# Patient Record
Sex: FEMALE | NOT HISPANIC OR LATINO | Employment: OTHER | URBAN - METROPOLITAN AREA
[De-identification: names, ages, dates, MRNs, and addresses within clinical notes are randomized per-mention and may not be internally consistent; named-entity substitution may affect disease eponyms.]

---

## 2020-01-08 ENCOUNTER — TELEPHONE (OUTPATIENT)
Dept: INTERNAL MEDICINE | Facility: CLINIC | Age: 75
End: 2020-01-08

## 2020-03-12 ENCOUNTER — TRANSCRIBE ORDERS (OUTPATIENT)
Dept: ADMINISTRATIVE | Facility: HOSPITAL | Age: 75
End: 2020-03-12

## 2020-03-12 DIAGNOSIS — I10 BENIGN HYPERTENSION: ICD-10-CM

## 2020-03-12 DIAGNOSIS — N18.30 CHRONIC KIDNEY DISEASE, STAGE III (MODERATE) (HCC): Primary | ICD-10-CM

## 2020-04-09 ENCOUNTER — APPOINTMENT (OUTPATIENT)
Dept: ULTRASOUND IMAGING | Facility: HOSPITAL | Age: 75
End: 2020-04-09

## 2020-04-09 ENCOUNTER — APPOINTMENT (OUTPATIENT)
Dept: CARDIOLOGY | Facility: HOSPITAL | Age: 75
End: 2020-04-09

## 2020-05-11 ENCOUNTER — HOSPITAL ENCOUNTER (OUTPATIENT)
Dept: ULTRASOUND IMAGING | Facility: HOSPITAL | Age: 75
End: 2020-05-11

## 2020-05-11 ENCOUNTER — APPOINTMENT (OUTPATIENT)
Dept: CARDIOLOGY | Facility: HOSPITAL | Age: 75
End: 2020-05-11

## 2020-06-01 ENCOUNTER — APPOINTMENT (OUTPATIENT)
Dept: ULTRASOUND IMAGING | Facility: HOSPITAL | Age: 75
End: 2020-06-01

## 2020-06-01 ENCOUNTER — APPOINTMENT (OUTPATIENT)
Dept: CARDIOLOGY | Facility: HOSPITAL | Age: 75
End: 2020-06-01

## 2020-06-05 ENCOUNTER — HOSPITAL ENCOUNTER (OUTPATIENT)
Dept: CARDIOLOGY | Facility: HOSPITAL | Age: 75
Discharge: HOME | End: 2020-06-05

## 2020-06-05 ENCOUNTER — HOSPITAL ENCOUNTER (OUTPATIENT)
Dept: ULTRASOUND IMAGING | Facility: HOSPITAL | Age: 75
Discharge: HOME | End: 2020-06-05
Admitting: INTERNAL MEDICINE

## 2020-06-05 DIAGNOSIS — I10 BENIGN HYPERTENSION: ICD-10-CM

## 2020-06-05 DIAGNOSIS — N18.30 CHRONIC KIDNEY DISEASE, STAGE III (MODERATE) (HCC): ICD-10-CM

## 2020-06-05 LAB
BH CV ECHO MEAS - DIST REN A EDV LEFT: 17.4 CM/S
BH CV ECHO MEAS - DIST REN A PSV LEFT: 65.2 CM/S
BH CV ECHO MEAS - MID REN A EDV LEFT: 16.8 CM/S
BH CV ECHO MEAS - MID REN A PSV LEFT: 60.8 CM/S
BH CV ECHO MEAS - PROX REN A EDV LEFT: 17.4 CM/S
BH CV ECHO MEAS - PROX REN A PSV LEFT: 67.4 CM/S
BH CV VAS BP LEFT ARM: NORMAL MMHG
BH CV VAS RENAL AORTIC MID EDV: 12.2 CM/S
BH CV VAS RENAL AORTIC MID PSV: 61.7 CM/S
BH CV VAS RENAL HILUM LEFT EDV: 12.2 CM/S
BH CV VAS RENAL HILUM LEFT PSV: 49.9 CM/S
BH CV VAS RENAL HILUM RIGHT EDV: 29.9 CM/S
BH CV VAS RENAL HILUM RIGHT PSV: 109 CM/S
BH CV XLRA MEAS DIST REN A EDV RIGHT: 29.2 CM/S
BH CV XLRA MEAS DIST REN A PSV RIGHT: 95.7 CM/S
BH CV XLRA MEAS MID REN A EDV RIGHT: 23.4 CM/S
BH CV XLRA MEAS MID REN A PSV RIGHT: 76 CM/S
BH CV XLRA MEAS PROX REN A EDV RIGHT: 15.6 CM/S
BH CV XLRA MEAS PROX REN A PSV RIGHT: 59.2 CM/S
BH CV XLRA MEAS RAR LEFT: 1.1
BH CV XLRA MEAS RAR RIGHT: 1.6
BH CV XLRA MEAS RENAL A ORG EDV LEFT: 9.22 CM/S
BH CV XLRA MEAS RENAL A ORG EDV RIGHT: 14.2 CM/S
BH CV XLRA MEAS RENAL A ORG PSV LEFT: 38.6 CM/S
BH CV XLRA MEAS RENAL A ORG PSV RIGHT: 66 CM/S
LEFT RENAL UPPER PARENCHYMA MAX: 41.5 CM/S
LEFT RENAL UPPER PARENCHYMA MIN: 8.29 CM/S
LEFT RENAL UPPER PARENCHYMA RI: 0.8
RIGHT RENAL UPPER PARENCHYMA MAX: 31.5 CM/S
RIGHT RENAL UPPER PARENCHYMA MIN: 6.7 CM/S
RIGHT RENAL UPPER PARENCHYMA RI: 0.79

## 2020-06-05 PROCEDURE — 93975 VASCULAR STUDY: CPT

## 2020-06-05 PROCEDURE — 76775 US EXAM ABDO BACK WALL LIM: CPT

## 2020-11-13 ENCOUNTER — CONSULT (OUTPATIENT)
Dept: ONCOLOGY | Facility: CLINIC | Age: 75
End: 2020-11-13

## 2020-11-13 VITALS
RESPIRATION RATE: 16 BRPM | DIASTOLIC BLOOD PRESSURE: 100 MMHG | HEART RATE: 69 BPM | BODY MASS INDEX: 26.98 KG/M2 | OXYGEN SATURATION: 96 % | TEMPERATURE: 97.3 F | WEIGHT: 158 LBS | HEIGHT: 64 IN | SYSTOLIC BLOOD PRESSURE: 193 MMHG

## 2020-11-13 DIAGNOSIS — D47.2 MONOCLONAL GAMMOPATHY: Primary | ICD-10-CM

## 2020-11-13 PROCEDURE — 99204 OFFICE O/P NEW MOD 45 MIN: CPT | Performed by: INTERNAL MEDICINE

## 2020-11-13 RX ORDER — ROSUVASTATIN CALCIUM 20 MG/1
TABLET, COATED ORAL
COMMUNITY
Start: 2020-11-12

## 2020-11-13 RX ORDER — PREDNISONE 20 MG/1
TABLET ORAL
COMMUNITY
Start: 2020-11-05

## 2020-11-13 RX ORDER — UBROGEPANT 50 MG/1
TABLET ORAL
COMMUNITY
Start: 2020-10-19

## 2020-11-13 RX ORDER — CALCITRIOL 0.25 UG/1
CAPSULE, LIQUID FILLED ORAL
COMMUNITY
Start: 2020-10-09

## 2020-11-13 RX ORDER — SOTALOL HYDROCHLORIDE 80 MG/1
TABLET ORAL
COMMUNITY
Start: 2020-09-30

## 2020-11-13 RX ORDER — LOSARTAN POTASSIUM 100 MG/1
TABLET ORAL
COMMUNITY
Start: 2020-10-05

## 2020-11-13 NOTE — PROGRESS NOTES
CHIEF COMPLAINT: Monoclonal gammopathy    REASON FOR REFERRAL: Same      RECORDS OBTAINED  Records of the patients history including those obtained from Dr. Chinchilla were reviewed and summarized in detail.    Oncology/Hematology History Overview Note   1.  Monoclonal gammopathy  2.  Stage III kidney disease presumably nephrosclerosis  3.  Hypertension   4.  Secondary hyperparathyroidism  5.  Migraine  6.  Borderline positive RADHA 1: 40 speckled pattern with dry eyes.  7.  Renal Cysts    -11/13/2020 initial [facility] hematology oncology consult: Dr. Chinchilla referred patientWith slightly elevated albumin to creatinine ratio and IgG kappa in the blood and urine.  I do not have the exact data from Dr. Chinchilla but just his office note.  Also with a borderline positive RADHA 1: 40 in a speckled pattern with dry eyes so could have associated autoimmune disease causing monoclonal gammopathy of undetermined significance as well.  I will get the myeloma panel and if that persist we will need to get a bone marrow biopsy and ultimately probably PET scan etc.  We will do phone visit with patient to go over the results of this first panel of tests to decide our next steps.  It will be important, if renal disease is our only CRAB criterion, to determine whether the renal disease is actually due to the monoclonal gammopathy or as indicated in Dr. Chinchilla's note that this is in fact unrelated nephrosclerosis.  If she has persistent monoclonal gammopathy and significant plasma cell increase over 10%, it may require kidney biopsy to sort out but I will discuss with Dr. Chinchilla at that junction.  If there is no significant plasma cell increase and modest light chains, then this could be just a marker of some autoimmune inflammatory process such as witnessed by the borderline positive RADHA.       Monoclonal gammopathy       HISTORY OF PRESENT ILLNESS:  The patient is a 75 y.o.  female, referred for monoclonal gammopathy on work-up of  "renal disease by Dr. Chinchilla with borderline positive RADHA and dry eyes    REVIEW OF SYSTEMS:  A 14 point review of systems was performed and is negative except as noted above.    History reviewed. No pertinent past medical history.  History reviewed. No pertinent surgical history.    Current Outpatient Medications on File Prior to Visit   Medication Sig Dispense Refill   • calcitriol (ROCALTROL) 0.25 MCG capsule      • losartan (COZAAR) 100 MG tablet      • predniSONE (DELTASONE) 20 MG tablet      • rosuvastatin (CRESTOR) 20 MG tablet      • sotalol (BETAPACE) 80 MG tablet      • Trelegy Ellipta 100-62.5-25 MCG/INH aerosol powder       • ubrogepant tablet        No current facility-administered medications on file prior to visit.        No Known Allergies    Social History     Socioeconomic History   • Marital status: Unknown     Spouse name: Not on file   • Number of children: Not on file   • Years of education: Not on file   • Highest education level: Not on file       History reviewed. No pertinent family history.    PHYSICAL EXAM:    BP (!) 193/100   Pulse 69   Temp 97.3 °F (36.3 °C)   Resp 16   Ht 162.6 cm (64\")   Wt 71.7 kg (158 lb)   SpO2 96%   BMI 27.12 kg/m²     ECOG: (1) Restricted in Physically Strenuous Activity, Ambulatory & Able to Do Work of Light Nature  General: well appearing female in no acute distress  HEENT: sclera anicteric, oropharynx clear.  Sclera slightly irritated however.  Lymphatics: no cervical, supraclavicular, inguinal, or axillary adenopathy  Cardiovascular: regular rate and rhythm, no murmurs  Neck: Supple; No thyromegaly  Lungs: clear to auscultation bilaterally. No respiratory distress.   Abdomen: soft, nontender, nondistended.  No palpable organomegaly  Extremities: no cyanosis, clubbing, edema, or cords  Skin: no rashes, lesions, bruising, or petechiae  Neuro: Alert and oriented x 4; Moving all extremities.  Psych: No anxiety or depression    No results found for: HGB, " HCT, MCV, PLT, WBC, NEUTROABS, LYMPHSABS, MONOSABS, EOSABS, BASOSABS  No results found for: GLUCOSE, BUN, CREATININE, NA, K, CL, CO2, CALCIUM, PROTEINTOT, ALBUMIN, BILITOT, ALKPHOS, AST, ALT        Assessment/Plan     1.  Monoclonal gammopathy  2.  Stage III kidney disease presumably nephrosclerosis  3.  Hypertension   4.  Secondary hyperparathyroidism  5.  Migraine  6.  Borderline positive RADHA 1: 40 speckled pattern with dry eyes.  7.  Renal Cysts    -11/13/2020 initial [facility] [place] hematology oncology consult: Dr. Chinchilla referred patientWith slightly elevated albumin to creatinine ratio and IgG kappa in the blood and urine.  I do not have the exact data from Dr. Chinchilla but just his office note.  Also with a borderline positive RADHA 1: 40 in a speckled pattern with dry eyes so could have associated autoimmune disease causing monoclonal gammopathy of undetermined significance as well.  I will get the myeloma panel and if that persist we will need to get a bone marrow biopsy and ultimately probably PET scan etc.  We will do phone visit with patient to go over the results of this first panel of tests to decide our next steps.  It will be important, if renal disease is our only CRAB criterion, to determine whether the renal disease is actually due to the monoclonal gammopathy or as indicated in Dr. Chinchilla's note that this is in fact unrelated nephrosclerosis.  If she has persistent monoclonal gammopathy and significant plasma cell increase over 10%, it may require kidney biopsy to sort out but I will discuss with Dr. Chinchilla at that junction.  If there is no significant plasma cell increase and modest light chains, then this could be just a marker of some autoimmune inflammatory process such as witnessed by the borderline positive RADHA.  Discussed with patient face-to-face 45 minutes greater than 50% spent counseling regarding this plan as outlined above.      Alfonso Mars MD    11/13/2020

## 2020-11-23 ENCOUNTER — HOSPITAL ENCOUNTER (OUTPATIENT)
Dept: GENERAL RADIOLOGY | Facility: HOSPITAL | Age: 75
Discharge: HOME | End: 2020-11-23

## 2020-11-23 ENCOUNTER — LAB (OUTPATIENT)
Dept: LAB | Facility: HOSPITAL | Age: 75
End: 2020-11-23

## 2020-11-23 DIAGNOSIS — D47.2 MONOCLONAL GAMMOPATHY: ICD-10-CM

## 2020-11-23 LAB
ALBUMIN SERPL-MCNC: 3.9 G/DL (ref 3.5–5.2)
ALBUMIN/GLOB SERPL: 1.6 G/DL
ALP SERPL-CCNC: 79 U/L (ref 39–117)
ALT SERPL W P-5'-P-CCNC: 9 U/L (ref 1–33)
ANION GAP SERPL CALCULATED.3IONS-SCNC: 11 MMOL/L (ref 5–15)
AST SERPL-CCNC: 18 U/L (ref 1–32)
B2 MICROGLOB SERPL-MCNC: 5.4 MG/L (ref 0.8–2.2)
BASOPHILS # BLD AUTO: 0.03 10*3/MM3 (ref 0–0.2)
BASOPHILS NFR BLD AUTO: 0.4 % (ref 0–1.5)
BILIRUB SERPL-MCNC: 0.3 MG/DL (ref 0–1.2)
BUN SERPL-MCNC: 23 MG/DL (ref 8–23)
BUN/CREAT SERPL: 15.5 (ref 7–25)
CALCIUM SPEC-SCNC: 9.2 MG/DL (ref 8.6–10.5)
CHLORIDE SERPL-SCNC: 107 MMOL/L (ref 98–107)
CO2 SERPL-SCNC: 27 MMOL/L (ref 22–29)
CREAT SERPL-MCNC: 1.48 MG/DL (ref 0.57–1)
CRP SERPL-MCNC: 0.09 MG/DL (ref 0–0.5)
DEPRECATED RDW RBC AUTO: 47.9 FL (ref 37–54)
EOSINOPHIL # BLD AUTO: 0.02 10*3/MM3 (ref 0–0.4)
EOSINOPHIL NFR BLD AUTO: 0.2 % (ref 0.3–6.2)
ERYTHROCYTE [DISTWIDTH] IN BLOOD BY AUTOMATED COUNT: 13.5 % (ref 12.3–15.4)
ERYTHROCYTE [SEDIMENTATION RATE] IN BLOOD: 13 MM/HR (ref 0–30)
GFR SERPL CREATININE-BSD FRML MDRD: 34 ML/MIN/1.73
GFR SERPL CREATININE-BSD FRML MDRD: 42 ML/MIN/1.73
GLOBULIN UR ELPH-MCNC: 2.5 GM/DL
GLUCOSE SERPL-MCNC: 76 MG/DL (ref 65–99)
HCT VFR BLD AUTO: 46.6 % (ref 34–46.6)
HGB BLD-MCNC: 14.5 G/DL (ref 12–15.9)
IMM GRANULOCYTES # BLD AUTO: 0.04 10*3/MM3 (ref 0–0.05)
IMM GRANULOCYTES NFR BLD AUTO: 0.5 % (ref 0–0.5)
LYMPHOCYTES # BLD AUTO: 2.03 10*3/MM3 (ref 0.7–3.1)
LYMPHOCYTES NFR BLD AUTO: 25 % (ref 19.6–45.3)
MCH RBC QN AUTO: 29.8 PG (ref 26.6–33)
MCHC RBC AUTO-ENTMCNC: 31.1 G/DL (ref 31.5–35.7)
MCV RBC AUTO: 95.7 FL (ref 79–97)
MONOCYTES # BLD AUTO: 0.39 10*3/MM3 (ref 0.1–0.9)
MONOCYTES NFR BLD AUTO: 4.8 % (ref 5–12)
NEUTROPHILS NFR BLD AUTO: 5.62 10*3/MM3 (ref 1.7–7)
NEUTROPHILS NFR BLD AUTO: 69.1 % (ref 42.7–76)
NRBC BLD AUTO-RTO: 0 /100 WBC (ref 0–0.2)
PLATELET # BLD AUTO: 222 10*3/MM3 (ref 140–450)
PMV BLD AUTO: 10.4 FL (ref 6–12)
POTASSIUM SERPL-SCNC: 3.4 MMOL/L (ref 3.5–5.2)
PROT SERPL-MCNC: 6.4 G/DL (ref 6–8.5)
RBC # BLD AUTO: 4.87 10*6/MM3 (ref 3.77–5.28)
SODIUM SERPL-SCNC: 145 MMOL/L (ref 136–145)
WBC # BLD AUTO: 8.13 10*3/MM3 (ref 3.4–10.8)

## 2020-11-23 PROCEDURE — 82784 ASSAY IGA/IGD/IGG/IGM EACH: CPT

## 2020-11-23 PROCEDURE — 82785 ASSAY OF IGE: CPT

## 2020-11-23 PROCEDURE — 84165 PROTEIN E-PHORESIS SERUM: CPT

## 2020-11-23 PROCEDURE — 83883 ASSAY NEPHELOMETRY NOT SPEC: CPT

## 2020-11-23 PROCEDURE — 82232 ASSAY OF BETA-2 PROTEIN: CPT

## 2020-11-23 PROCEDURE — 85652 RBC SED RATE AUTOMATED: CPT

## 2020-11-23 PROCEDURE — 86334 IMMUNOFIX E-PHORESIS SERUM: CPT

## 2020-11-23 PROCEDURE — 85025 COMPLETE CBC W/AUTO DIFF WBC: CPT

## 2020-11-23 PROCEDURE — 86140 C-REACTIVE PROTEIN: CPT

## 2020-11-23 PROCEDURE — 36415 COLL VENOUS BLD VENIPUNCTURE: CPT

## 2020-11-23 PROCEDURE — 77075 RADEX OSSEOUS SURVEY COMPL: CPT

## 2020-11-23 PROCEDURE — 80053 COMPREHEN METABOLIC PANEL: CPT

## 2020-11-24 LAB
KAPPA LC FREE SER-MCNC: 39.3 MG/L (ref 3.3–19.4)
KAPPA LC FREE/LAMBDA FREE SER: 3.93 {RATIO} (ref 0.26–1.65)
LAMBDA LC FREE SERPL-MCNC: 10 MG/L (ref 5.7–26.3)

## 2020-11-25 LAB
ALBUMIN SERPL ELPH-MCNC: 3.3 G/DL (ref 2.9–4.4)
ALBUMIN/GLOB SERPL: 1.2 {RATIO} (ref 0.7–1.7)
ALPHA1 GLOB SERPL ELPH-MCNC: 0.2 G/DL (ref 0–0.4)
ALPHA2 GLOB SERPL ELPH-MCNC: 0.8 G/DL (ref 0.4–1)
B-GLOBULIN SERPL ELPH-MCNC: 0.8 G/DL (ref 0.7–1.3)
GAMMA GLOB SERPL ELPH-MCNC: 0.9 G/DL (ref 0.4–1.8)
GLOBULIN SER-MCNC: 2.8 G/DL (ref 2.2–3.9)
IGA SERPL-MCNC: 88 MG/DL (ref 64–422)
IGA SERPL-MCNC: 89 MG/DL (ref 64–422)
IGE SERPL-ACNC: 53 IU/ML (ref 6–495)
IGG SERPL-MCNC: 1036 MG/DL (ref 586–1602)
IGG SERPL-MCNC: 1057 MG/DL (ref 586–1602)
IGM SERPL-MCNC: 32 MG/DL (ref 26–217)
IGM SERPL-MCNC: 34 MG/DL (ref 26–217)
INTERPRETATION SERPL IEP-IMP: ABNORMAL
LABORATORY COMMENT REPORT: ABNORMAL
M PROTEIN SERPL ELPH-MCNC: ABNORMAL G/DL
PROT SERPL-MCNC: 6.1 G/DL (ref 6–8.5)

## 2020-12-04 ENCOUNTER — LAB (OUTPATIENT)
Dept: LAB | Facility: HOSPITAL | Age: 75
End: 2020-12-04

## 2020-12-04 PROCEDURE — 86335 IMMUNFIX E-PHORSIS/URINE/CSF: CPT

## 2020-12-04 PROCEDURE — 84156 ASSAY OF PROTEIN URINE: CPT

## 2020-12-04 PROCEDURE — 84166 PROTEIN E-PHORESIS/URINE/CSF: CPT

## 2020-12-04 PROCEDURE — 81050 URINALYSIS VOLUME MEASURE: CPT

## 2020-12-08 LAB
ALBUMIN 24H MFR UR ELPH: 53.3 %
ALPHA1 GLOB 24H MFR UR ELPH: 3.3 %
ALPHA2 GLOB 24H MFR UR ELPH: 9.9 %
B-GLOBULIN MFR UR ELPH: 21 %
GAMMA GLOB 24H MFR UR ELPH: 12.4 %
HIV 1 & 2 AB SER-IMP: ABNORMAL
INTERPRETATION UR IFE-IMP: ABNORMAL
M PROTEIN 24H MFR UR ELPH: 5.3 %
M PROTEIN 24H UR ELPH-MRATE: 16 MG/24 HR
PROT 24H UR-MRATE: 307 MG/24 HR (ref 30–150)
PROT UR-MCNC: 30.7 MG/DL